# Patient Record
Sex: MALE | Race: WHITE | NOT HISPANIC OR LATINO
[De-identification: names, ages, dates, MRNs, and addresses within clinical notes are randomized per-mention and may not be internally consistent; named-entity substitution may affect disease eponyms.]

---

## 2022-05-03 ENCOUNTER — RESULT REVIEW (OUTPATIENT)
Age: 57
End: 2022-05-03

## 2023-09-27 ENCOUNTER — APPOINTMENT (OUTPATIENT)
Dept: PULMONOLOGY | Facility: CLINIC | Age: 58
End: 2023-09-27

## 2023-09-27 PROBLEM — Z00.00 ENCOUNTER FOR PREVENTIVE HEALTH EXAMINATION: Status: ACTIVE | Noted: 2023-09-27

## 2024-06-27 ENCOUNTER — NON-APPOINTMENT (OUTPATIENT)
Age: 59
End: 2024-06-27

## 2024-08-19 ENCOUNTER — APPOINTMENT (OUTPATIENT)
Dept: THORACIC SURGERY | Facility: CLINIC | Age: 59
End: 2024-08-19

## 2024-08-20 ENCOUNTER — APPOINTMENT (OUTPATIENT)
Dept: THORACIC SURGERY | Facility: CLINIC | Age: 59
End: 2024-08-20
Payer: COMMERCIAL

## 2024-08-20 VITALS — WEIGHT: 260 LBS | BODY MASS INDEX: 35.21 KG/M2 | HEIGHT: 72 IN

## 2024-08-20 DIAGNOSIS — Z80.1 FAMILY HISTORY OF MALIGNANT NEOPLASM OF TRACHEA, BRONCHUS AND LUNG: ICD-10-CM

## 2024-08-20 DIAGNOSIS — F17.210 NICOTINE DEPENDENCE, CIGARETTES, UNCOMPLICATED: ICD-10-CM

## 2024-08-20 PROCEDURE — ACP01: CPT | Mod: NC

## 2024-08-20 NOTE — ASSESSMENT
[Discussed Risks and Advised to Quit Smoking] : Discussed risks and advised to quit smoking [Ready] : Patient is ready for cessation intervention [Contemplation] : Contemplation: The patient is considering quitting smoking [de-identified] : Acknowledged how  difficult it is to quit smoking. Advised that quitting smoking is the most important thing a person can do for their health. Discussed strategies to deal with cravings. Suggested keeping a log of cigarette use and the triggers in an effort to identify triggers and break routines/habits. Suggested writing down time of each cigarette and attempting to delay the next cigarette. Suggested exercise as a distraction. Discussed the importance of having a strategy planned in advance of quit date. Discussed use of daily nicotine patch and use of lozengeprn for cravings. Instructed in proper use of lozenge and patch.  147

## 2024-08-20 NOTE — HISTORY OF PRESENT ILLNESS
[Current] : Current [TextBox_13] : Referred by Dr. Jadiel SNOWDEN had telephonic visit for a review of eligibility and discussion of the Low dose CT lung cancer screening program. A telephonic visit occurred due to the patient not having access to a smart phone or a computer for an audio/visual visit. The following was reviewed and confirmed the patient meets screening eligibility criteria. -Age 59 year Smoking Status: -Current smoker Started smoking at   14-15  years old. Smoked occasionally for about 3 years then smoked  PPD for 40 years. -Number of pack years smokin  Mr. SNOWDEN reports productive cough brownish sputum. He denies any signs or symptoms of lung cancer including new cough, changing cough, hemoptysis, and unintentional weight loss.   Mr. SNOWDEN denies HX of lung disease, heart disease, connective tissue disease, and any personal history of cancer. Reports no lung cancer in a 1st degree relative. Reports no lung cancer in a 2nd degree relative: 2  Maternal cousins .    [PacksperYear] : 20

## 2024-08-20 NOTE — PLAN
[Smoking Cessation Guidance Provided] : Smoking cessation guidance was provided to patient [[place]] : referred to [place] [phone] [Smoking Cessation] : smoking cessation [FreeTextEntry1] : -Follow up with  his PCP after his LDCT results have been reviewed by the multidisciplinary clinical team, if needed.   -Encouraged smoking cessation.    -[facility] Smokers Quit line [phone]     Should I screen? tool utilized. 6 Year risk of lung cancer is   1%.    Patient wishes to proceed with screening.   Engaged in discussion regarding risks of screening during Covid-19 pandemic and precautions that are being used  to reduce exposure.   Engaged in shared decision making with Mr. SNOWDEN . Answered all questions. He verbalized understanding and agreement. He knows to call back with and questions or concerns.